# Patient Record
Sex: MALE | Race: WHITE | URBAN - METROPOLITAN AREA
[De-identification: names, ages, dates, MRNs, and addresses within clinical notes are randomized per-mention and may not be internally consistent; named-entity substitution may affect disease eponyms.]

---

## 2019-05-15 ENCOUNTER — TELEPHONE (OUTPATIENT)
Dept: SURGERY | Age: 64
End: 2019-05-15

## 2019-05-15 ENCOUNTER — PROCEDURE VISIT (OUTPATIENT)
Dept: SURGERY | Age: 64
End: 2019-05-15
Payer: COMMERCIAL

## 2019-05-15 VITALS
SYSTOLIC BLOOD PRESSURE: 117 MMHG | DIASTOLIC BLOOD PRESSURE: 76 MMHG | HEART RATE: 74 BPM | WEIGHT: 191 LBS | TEMPERATURE: 97.4 F | OXYGEN SATURATION: 96 %

## 2019-05-15 DIAGNOSIS — D07.4 SQUAMOUS CELL CARCINOMA IN SITU OF GLANS PENIS: Primary | ICD-10-CM

## 2019-05-15 PROCEDURE — 17311 MOHS 1 STAGE H/N/HF/G: CPT | Performed by: DERMATOLOGY

## 2019-05-15 PROCEDURE — 12042 INTMD RPR N-HF/GENIT2.6-7.5: CPT | Performed by: DERMATOLOGY

## 2019-05-15 PROCEDURE — 17312 MOHS ADDL STAGE: CPT | Performed by: DERMATOLOGY

## 2019-05-15 RX ORDER — OFLOXACIN 3 MG/ML
SOLUTION/ DROPS OPHTHALMIC
COMMUNITY

## 2019-05-15 RX ORDER — ASPIRIN 325 MG
325 TABLET ORAL DAILY
COMMUNITY

## 2019-05-15 RX ORDER — CEPHALEXIN 500 MG/1
500 CAPSULE ORAL 3 TIMES DAILY
Qty: 15 CAPSULE | Refills: 0 | Status: CANCELLED | OUTPATIENT
Start: 2019-05-15

## 2019-05-15 RX ORDER — CYCLOBENZAPRINE HCL 10 MG
10 TABLET ORAL
COMMUNITY

## 2019-05-15 RX ORDER — AMLODIPINE BESYLATE 10 MG/1
10 TABLET ORAL DAILY
COMMUNITY

## 2019-05-15 RX ORDER — GABAPENTIN 100 MG/1
100 CAPSULE ORAL 3 TIMES DAILY
COMMUNITY

## 2019-05-15 RX ORDER — LISINOPRIL 20 MG/1
20 TABLET ORAL DAILY
COMMUNITY

## 2019-05-15 RX ORDER — IBUPROFEN 600 MG/1
600 TABLET ORAL
COMMUNITY

## 2019-05-15 RX ORDER — PREDNISOLONE ACETATE 10 MG/ML
1 SUSPENSION/ DROPS OPHTHALMIC
COMMUNITY

## 2019-05-15 RX ORDER — GUAIFENESIN AND DEXTROMETHORPHAN HYDROBROMIDE 100; 10 MG/5ML; MG/5ML
5 SOLUTION ORAL
COMMUNITY

## 2019-05-15 RX ORDER — NAPROXEN 500 MG/1
500 TABLET ORAL 2 TIMES DAILY WITH MEALS
COMMUNITY

## 2019-05-15 RX ORDER — KETOROLAC TROMETHAMINE 4 MG/ML
SOLUTION/ DROPS OPHTHALMIC 4 TIMES DAILY
COMMUNITY

## 2019-05-15 RX ORDER — SIMVASTATIN 20 MG
20 TABLET ORAL NIGHTLY
COMMUNITY

## 2019-05-15 NOTE — PATIENT INSTRUCTIONS
[facility] Office Hours:    Monday-Thursday  7:30 AM-4:30 PM    Friday  9:00 AM-3:00 PM          POST-OPERATIVE CARE FOR STICHES              Bandage change after 48 hours    CARING FOR YOUR SURGICAL SITE  The bandage should remain on and completley dry for 48 hours. Do NOT get the bandage wet. 1. After the first 48 hours, gently remove the remaining part of the bandage. It can be helpful to moisten the bandage edges in the shower. Steri strips may still be on the wound. It is ok, they will fall off slowly with the daily bandage changes. 2. Gently clean the wound daily with mild soap and water. Try to clean off crust and debris. 3. Dry (pat) the area with a clean Q-tip or gauze. 4. Apply a layer of Vaseline/ Aquaphor (or Bacitracin if your doctor recommends) to the wound area only. 5. Cut a piece of Telfa (or any non-stick dressing) to fit just over the wound and secure it with paper tape. If the wound is small you may use a Band- Aid. Keep area covered for a total of 1 week(s). If the dressing comes off or if you have questions, or concerns about the dressing, please call the office for instructions! POST OPERATIVE INSTRUCTIONS    1. Activity: Do not lift anything heavier than a gallon of milk for 1 week. Also, avoid strenuous activity such as running, power walking or contact sports. 2. Eating and drinking: Do not drink alcohol for 48 hours after your procedure. Alcohol increases the chances of bleeding. 3. Medicines   -If you have discomfort, take Acetaminophen (Tylenol or Extra Strength Tylenol). Follow the instructions and warning on the bottle. -If your doctor has prescribed you an Aspirin daily, please keep taking it. Do not take extra Aspirin or medicines containing Aspirin (such as Bri-Randolph and Excedrin) for 48 hours  after your procedure. Bleeding: If bleeding occurs, DO NOT remove the bandage.  Put firm pressure on the area with gauze for 20 minutes without

## 2019-05-15 NOTE — PROGRESS NOTES
MOHS PROCEDURE NOTE    PHYSICIAN:  Maral Whiting. Ld Mittal MD    ASSISTANT: Stephan Cheek RN     REFERRING PROVIDER:  Richard Maradiaga PA-C    PREOPERATIVE DIAGNOSIS: Squamous Cell Carcinoma in Situ     SPECIFIC MOHS INDICATIONS:  location    AUC SCORIN/9    POSTOPERATIVE DIAGNOSIS: SAME    LOCATION: Left penile shaft    OPERATIVE PROCEDURE:  MOHS MICROGRAPHIC SURGERY    RECONSTRUCTION OF DEFECT: Intermediate layered closure    PREOPERATIVE SIZE: 24x24 MM    DEFECT SIZE: 34x26 MM    LENGTH OF REPAIRED WOUND/SIZE OF FLAP/SIZE OF GRAFT:  44 MM    ANESTHESIA:  8 mL 1% lidocaine with epinephrine 1:100,000 buffered. EBL:  MINIMAL    DURATION OF PROCEDURE:  2.75 HOURS    POSTOPERATIVE OBSERVATION: 0.75 HOUR    SPECIMENS:  SEE MOHS MAP    COMPLICATIONS:  NONE    DESCRIPTION OF PROCEDURE:  The patient was given a mirror, as appropriate, and the biopsy site was identified, marked with a surgical marking pen, and verified by the patient. Options for treatment were discussed and the patient was informed that Mohs surgery was the selected treatment based on its lower recurrence rate, given the features listed above, as compared to other treatment modalities such as excision, radiation, or curettage, and agreed with this treatment plan. Risks and benefits including bruising, swelling, bleeding, infection, nerve injury, recurrence, and scarring were discussed with the patient prior to the procedure and a written consent detailing these and other risks was reviewed with the patient and signed. There was a time out for person and procedure verification. The surgical site was prepped with an antiseptic solution. Application of an antiseptic solution was repeated before each surgical stage. Stage I:  The clinically-apparent tumor was carefully defined and debulked, determining the edge of the surgical excision.     A thin layer of tumor-laden tissue was excised with a narrow margin of normal-appearing skin, using the technique of Mohs. A map was prepared to correspond to the area of skin from which it was excised. Hemostasis was achieved using electrosurgery. The wound was bandaged. The tissue was prepared for the cryostat and sectioned. 1 section(s) prepared. Each section was coded, cut, and stained for microscopic examination. The entire base and margins of the excised piece of tissue were examined by the surgeon. STage I, II and III:  SCC - In situ: full thickness dysplasia with parakeratosis, acanthosis and complete disorganization of the epidermal architecture and loss of maturation. The cells are large with prominent irregular hyperchromatic nuclei. The remaining tumor was noted and the next stage was performed. Stage II, III and IV :  A thin layer of tissue was removed at the histologically-identified sites of remaining tumor. The entire procedure as described in stage I was repeated to process the tissue according to Mohs technique. 2 section(s) prepared for stage II, 1 section for stages III and IV. No tumor was identified at the peripheral margins of stage IV of microscopically controlled surgery. DEFECT MANAGEMENT:    REPAIR DESCRIPTION:  Various closure modalities were discussed with the patient, and it was decided that an intermediate layered repair would best preserve normal anatomic and functional relationships. Additional risk of wound dehiscence was discussed. The area was anesthetized with 1% lidocaine with epinephrine 1:100,000 buffered, was given a sterile prep using Chlorhexidine gluconate 4% solution and draped in the usual sterile fashion. Recreation and enlargement of the wound was performed by excising cones of tissue via the triangulation technique. The final incision lines were placed with respect for the patient's natural skin tension lines in a linear configuration to avoid functional and aesthetic distortion of adjacent free margins.  Following minimal undermining, meticulous hemostasis was obtained with spot monopolar electrocoagulation. Subcutaneous dead space and dermis were closed using 5-0 Vicryl buried subcutaneous interrupted suture and the epidermis was approximated with 6-0 Fast absorbing gut running epidermal sutures. WOUND COVERAGE:  The wound was cleaned with normal saline solution, dried off, Aquaphor ointment was applied, and the wound was covered. A dressing was applied for stabilization and light pressure. The patient was given detailed oral and written instructions on postoperative care. There were no complications. The patient left the Unit in good medical condition. FOLLOW-UP:  As dissolving sutures were placed, the patient was asked to return if any questions or concerns arose, but otherwise will return to see general dermatology per their instructions.

## 2019-05-16 ENCOUNTER — TELEPHONE (OUTPATIENT)
Dept: SURGERY | Age: 64
End: 2019-05-16

## 2019-05-16 NOTE — TELEPHONE ENCOUNTER
The patient was in the office on 5/15/19 for Mohs surgery located on the Left penile shaft with intermediate layered closure repair. The patient tolerated the procedure well and left the office in good condition. A post-operative telephone call was placed at 13:46 in order to check on the patient's recovery process. The patient reported doing well and had no complaints. All of the patient's questions were answered.

## 2019-06-03 ENCOUNTER — TELEPHONE (OUTPATIENT)
Dept: SURGERY | Age: 64
End: 2019-06-03

## 2019-06-03 NOTE — TELEPHONE ENCOUNTER
Pt could not get a ride for today; however, he has a ride for tomorrow 6/4 and has been scheduled for 11 a.m.  (FYI)     Pt was seen on 6/4.

## 2019-06-04 ENCOUNTER — OFFICE VISIT (OUTPATIENT)
Dept: SURGERY | Age: 64
End: 2019-06-04
Payer: COMMERCIAL

## 2019-06-04 DIAGNOSIS — Z51.89 VISIT FOR WOUND CHECK: Primary | ICD-10-CM

## 2019-06-04 DIAGNOSIS — T81.30XA WOUND DEHISCENCE: ICD-10-CM

## 2019-06-04 PROCEDURE — 4004F PT TOBACCO SCREEN RCVD TLK: CPT | Performed by: DERMATOLOGY

## 2019-06-04 PROCEDURE — G8421 BMI NOT CALCULATED: HCPCS | Performed by: DERMATOLOGY

## 2019-06-04 PROCEDURE — 3017F COLORECTAL CA SCREEN DOC REV: CPT | Performed by: DERMATOLOGY

## 2019-06-04 PROCEDURE — 99212 OFFICE O/P EST SF 10 MIN: CPT | Performed by: DERMATOLOGY

## 2019-06-04 PROCEDURE — G8427 DOCREV CUR MEDS BY ELIG CLIN: HCPCS | Performed by: DERMATOLOGY

## 2019-06-04 NOTE — PATIENT INSTRUCTIONS
[facility] Office Hours:    Monday-Thursday  7:30 AM-4:30 PM    Friday  9:00 AM-3:00 PM    Patient instructed to gently wash area with soap and water daily, pat dry, and apply vaseline or petroleum jelly a couple times throughout the day.

## 2019-06-04 NOTE — PROGRESS NOTES
S: The patient is here today for wound/scar check. The patient had Mohs surgery located on the glans penis with Intermediate layered closure repair, 2 week(s) ago. The patient c/o area opening after doing heavy lifting this past weekend. denies pain, discharge or other sx. EXAM: base of glans with superficial dehiscense that is already starting to granulate. No erythema, purulence    IMPRESSION/PLAN:  Dehiscence of wound: Will allow to heal by second intent. Pt to keep wound clean and apply aquaphor daily. Will recheck in about 3 weeks.

## 2019-06-25 ENCOUNTER — TELEPHONE (OUTPATIENT)
Dept: SURGERY | Age: 64
End: 2019-06-25

## 2019-06-25 NOTE — TELEPHONE ENCOUNTER
The patient called cx appt for today. He said the aera looks healed and is unable to make appt due to a ride issue from [employer]. He mentioned a nurse from our office disclosed to him that he needs to schedule a f/u appt with Lucinda Perry for somet time cream.  Please confirm exactly what the patient needs to do and let me know so I can inform the patient as well as notify Lucinda Perry. He has difficulty getting through to their office.

## 2019-07-17 NOTE — TELEPHONE ENCOUNTER
Pt had left a message asking if we could reach out to [facility]; he had been trying to contact them and was unable to get through. I emailed [facility] [employer] contact to contact patient.

## 2020-05-27 ENCOUNTER — TELEPHONE (OUTPATIENT)
Dept: SURGERY | Age: 65
End: 2020-05-27

## 2020-07-07 ENCOUNTER — TELEPHONE (OUTPATIENT)
Dept: SURGERY | Age: 65
End: 2020-07-07

## 2020-07-15 ENCOUNTER — OFFICE VISIT (OUTPATIENT)
Dept: SURGERY | Age: 65
End: 2020-07-15
Payer: MEDICARE

## 2020-07-15 VITALS — TEMPERATURE: 98.2 F

## 2020-07-15 PROBLEM — D40.8 NEOPLASM OF UNCERTAIN BEHAVIOR OF PENIS: Status: ACTIVE | Noted: 2020-07-15

## 2020-07-15 PROCEDURE — 54100 BIOPSY OF PENIS: CPT | Performed by: DERMATOLOGY

## 2020-07-15 PROCEDURE — 99212 OFFICE O/P EST SF 10 MIN: CPT | Performed by: DERMATOLOGY

## 2020-07-15 NOTE — PATIENT INSTRUCTIONS
[facility] Office Hours:    Monday-Thursday  7:30 AM-4:30 PM    Friday  9:00 AM-1:00 PM    Biopsy Wound Care Instructions   Cleanse the wound with mild soapy water daily.  Gently dry the area.  Apply Vaseline or petroleum jelly to the wound using a cotton tipped applicator.  Cover with a clean bandage.  Repeat this process until the biopsy site is healed.  If you had stitches placed, continue treating the site until the stitches are removed. Remember to make an appointment to return to have your stitches removed by our staff.  You may shower and bathe as usual.   ** Biopsy results generally take around 7 business days to come back. If you have not heard from us by then, please call the office at [phone] between [address] [place] [address] Monday through Friday.

## 2020-07-16 NOTE — PROGRESS NOTES
S: pt is here for f/u. He had MOhs surgery of extensive SCCIS on penile shaft last June. Did well following surgery and once healed was recommended to return to his referring dermatologist to discuss adjuvant tx regimens such as Aldara due to concern for likely HPV involvement. Pt did not return to his referring derm. He reports now a 1 month h/o new lesion on the penile shaft close to scar from previous Mohs surgery. No tx. No alleviating or exacerbating factors. O: wdwn, nad  On the penile shaft are several lesions  Distal:  Brown stuck on 3mm papule- r/o verruca vs bp  Distal medial:  Ill defined hyperpigmented erythematous scaly 10mm patch r/o SCCIS  Proximal medial:  10mm hyperkeratotic ulcerated papule r/o scc  Proximal:  Brown stuck on 3mm papule r/o verruca vs bp  Inferior: brown stuck on 3 mm papule r/o verruca vs bp    AP:  1. Neoplasm of uncertain behavior:  R/O SCCIS, vs BP vs SCC  Location: see above  - Discussed possible diagnosis. Patient agreeable to biopsy. Verbal consent obtained after risks (infection, bleeding, scar), benefits and alternatives explained. - The area to be biopsied was marked and a verbal time-out was performed. - Local anesthesia was achieved with 1% lidocaine with epinephrine/sodium bicarbonate. - The area was cleansed with alcohol and a tangential shave biopsy was performed using a derma-blade. Hemostasis was achieved with aluminum chloride. A small amount of petroleum jelly was applied to the wound and a band-aid applied.   - The specimens were placed in a correctly identified specimen container.  - 5 specimens sent to pathology. There were no immediate complications and the patient left the office in good condition.  -  Patient educated re: risk of bleeding, infection, scar and wound care instructions reviewed. The patient will be informed of biopsy results by phone or letter as soon as available.     May pretreat with aldara if pt requires surgical removal of any of the above lesion due to malignancy.

## 2020-07-20 ENCOUNTER — TELEPHONE (OUTPATIENT)
Dept: SURGERY | Age: 65
End: 2020-07-20

## 2020-07-20 LAB — DERMATOLOGY PATHOLOGY REPORT: ABNORMAL

## 2020-07-20 NOTE — TELEPHONE ENCOUNTER
I reviewed results of the biopsy with the patient. 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

## 2020-08-13 ENCOUNTER — TELEPHONE (OUTPATIENT)
Dept: SURGERY | Age: 65
End: 2020-08-13

## 2020-08-13 ENCOUNTER — PROCEDURE VISIT (OUTPATIENT)
Dept: SURGERY | Age: 65
End: 2020-08-13
Payer: MEDICARE

## 2020-08-13 PROCEDURE — 17311 MOHS 1 STAGE H/N/HF/G: CPT | Performed by: DERMATOLOGY

## 2020-08-13 PROCEDURE — 12042 INTMD RPR N-HF/GENIT2.6-7.5: CPT | Performed by: DERMATOLOGY

## 2020-08-13 PROCEDURE — 17312 MOHS ADDL STAGE: CPT | Performed by: DERMATOLOGY

## 2020-08-13 RX ORDER — CIPROFLOXACIN 500 MG/1
500 TABLET, FILM COATED ORAL 2 TIMES DAILY
Qty: 14 TABLET | Refills: 0 | Status: SHIPPED | OUTPATIENT
Start: 2020-08-13 | End: 2020-08-13 | Stop reason: CLARIF

## 2020-08-13 RX ORDER — DOXYCYCLINE HYCLATE 100 MG
100 TABLET ORAL 2 TIMES DAILY
Qty: 10 TABLET | Refills: 0 | Status: SHIPPED | OUTPATIENT
Start: 2020-08-13 | End: 2020-08-18

## 2020-08-13 NOTE — PROGRESS NOTES
PRE-PROCEDURE SCREENING    Pacemaker/ICD: No  Difficulty with numbing in the past: No  Local Anesthesia Reaction/passing out: No  Latex or adhesive allergy:  No  Bleeding/Clotting Disorders: No  Anticoagulant Therapy: Yes, asa 325 mg  Joint prosthesis: No  Artificial Heart Valve: No  Stroke or Seizures: Yes, hx of stroke  Organ Transplant or Lymphoma: No  Immunosuppression: No  Respiratory Problems: Yes, COPD

## 2020-08-13 NOTE — PROGRESS NOTES
The patient was counseled at length about the risks of jasmina Covid-19 during their perioperative period and any recovery window from their procedure. The patient was made aware that jasmina Covid-19  may worsen their prognosis for recovering from their procedure  and lend to a higher morbidity and/or mortality risk. All material risks, benefits, and reasonable alternatives including postponing the procedure were discussed. The patient does wish to proceed with the procedure at this time. MOHS PROCEDURE NOTE    PHYSICIAN:  Anthony Gomez. Tena Finch MD    ASSISTANT: Lynn Thomas RN     REFERRING PROVIDER:  Anthony Gomez. MD Korey    PREOPERATIVE DIAGNOSIS: Squamous Cell Carcinoma in Situ and Superficial Squamous Cell Carcinoma     SPECIFIC MOHS INDICATIONS:  size and location    AUC SCORIN/9    POSTOPERATIVE DIAGNOSIS: SAME    LOCATION: Medial proximal penile shaft and medial distal penile shaft    OPERATIVE PROCEDURE:  MOHS MICROGRAPHIC SURGERY    RECONSTRUCTION OF DEFECT: Intermediate layered closure    PREOPERATIVE SIZE: 24x11 MM    DEFECT SIZE: 37x28 MM    LENGTH OF REPAIRED WOUND/SIZE OF FLAP/SIZE OF GRAFT:  48 MM    ANESTHESIA: 9 mL 1% lidocaine with epinephrine 1:100,000 buffered. EBL:  MINIMAL    DURATION OF PROCEDURE:  2.25 HOURS    POSTOPERATIVE OBSERVATION: 0.75 HOUR    SPECIMENS:  SEE MOHS MAP    COMPLICATIONS:  NONE    DESCRIPTION OF PROCEDURE:  The patient was given a mirror, as appropriate, and the biopsy site was identified, marked with a surgical marking pen, and verified by the patient. Options for treatment were discussed and the patient was informed that Mohs surgery was the selected treatment based on its lower recurrence rate, given the features listed above, as compared to other treatment modalities such as excision, radiation, or curettage, and agreed with this treatment plan.   Risks and benefits including bruising, swelling, bleeding, infection, nerve injury, recurrence, and

## 2020-08-13 NOTE — PATIENT INSTRUCTIONS
[facility] Office Hours:    Monday-Thursday  7:30 AM-4:30 PM    Friday  9:00 AM-1:00 PM      POST-OPERATIVE CARE FOR STICHES  Bandage change after 48 hours    CARING FOR YOUR SURGICAL SITE  The bandage should remain on and completely dry for 48 hours. Do NOT get the bandage wet. 1. After the first 48 hours, gently remove the remaining part of the bandage. It can be helpful to moisten the bandage edges in the shower. Steri strips may still be on the wound. It is ok, they will fall off slowly with the daily bandage changes. 2. Gently clean the wound daily with mild soap and water. Try to clean off crust and debris. 3. Dry (pat) the area with a clean Q-tip or gauze. 4. Apply a layer of Vaseline/ Aquaphor (or Bacitracin if your doctor recommends) to the wound area only. 5. Cut a piece of Telfa (or any non-stick dressing) to fit just over the wound and secure it with paper tape. If the wound is small you may use a Band- Aid. Keep area covered for a total of 1 week(s). If the dressing comes off or if you have questions, or concerns about the dressing, please call the office for instructions! POST OPERATIVE INSTRUCTIONS    1. Activity: Do not lift anything heavier than a gallon of milk for 1 week. Also, avoid strenuous activity such as running, power walking or contact sports. 2. Eating and drinking: Do not drink alcohol for 48 hours after your procedure. Alcohol increases the chances of bleeding. 3. Medicines   -If you have discomfort, take Acetaminophen (Tylenol or Extra Strength Tylenol). Follow the instructions and warning on the bottle. -If your doctor has prescribed you an Aspirin daily, please keep taking it. Do not take extra Aspirin or medicines containing Aspirin (such as Bri-Castalia and Excedrin) for 48 hours after your procedure. Bleeding: If bleeding occurs, DO NOT remove the bandage. Put firm pressure on the area with gauze for 20 minutes without peeking.  If the bleeding continues, apply pressure for another 20 minutes. If the bleeding does not stop after you apply pressure, call us right away. If you can not call, go to the nearest emergency room or urgent care facility. What to expect:  You may have these symptoms. They are normal and should get better with time:  1. Swelling. Swelling usually increases for the first 48 hours after your procedure and then begins to improve. Some soreness and redness around your wound. If we worked close to your eyes (forehead, nose, temple, or upper cheeks) your eyes may become swollen and/ or black and blue. 2. Bruising, which could last 1 week or more. 3. Pink and bumpy appearance to the scar. This may happen a few weeks after your procedure. After 4 weeks, you may gently massage the area each day with facial moisturizer or petroleum jelly (Vaseline or Aquaphor). This will help to smooth the skin and improve the appearance of the scar. The color of your scar will fade over time, but it may be pink for several months after the procedure. The scar may take 6 months to 1 year to reach its final color and appearance. 4. \"Spitting\" suture. Occasionally, an inside suture (stitch) does not completely dissolve. When this happens, (generally 4-8 weeks after surgery), it causes a bump or \"pimple\" to form on the scar. This is easily removed and is not at all serious. It does not mean the skin cancer has returned. Contact us if it happens, but do not be alarmed. Vitamin E oil is NOT necessary. A good moisturizer is just as effective. Sunscreen IS necessary. Use at least and SPF 30 sunscreen daily- even in winter    Call us at [phone] right away if you have any of the following symptoms:  -Bleeding that you can not stop (see highlighted area above). -Pain that lasts longer than 48 hours.  -Your wound becomes more painful, red or hot.   -Bruising and swelling that does not begin to improve within the 48 hours or gets worse

## 2020-08-14 ENCOUNTER — TELEPHONE (OUTPATIENT)
Dept: SURGERY | Age: 65
End: 2020-08-14

## 2020-08-14 NOTE — TELEPHONE ENCOUNTER
The patient was in the office 8/13/20 for mohs located on the medial proximal penile shaft and medial distal penile shaft  with ILC repair. The patient tolerated the procedure well and left the office in good condition. A post-operative telephone call was placed at 10:02am on 8/14/20 in order to check on the patient's recovery process. The patient was not able to be reached and a phone message was left.

## 2020-08-18 ENCOUNTER — TELEPHONE (OUTPATIENT)
Dept: SURGERY | Age: 65
End: 2020-08-18

## 2020-08-18 NOTE — TELEPHONE ENCOUNTER
Jesse Mercedes from [facility] in [place], [place]. 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 Due to his age of 60 this vaccine is not available for patients over the age of 32. If there is new indication for something else please contact her at [phone] press option #1 and ask for Jesse Mercedes.

## 2020-08-21 NOTE — TELEPHONE ENCOUNTER
Michelle Mcintosh from [facility] office called stating that they will need a letter with the indication and reason for the HPV vaccine in order for his insurance to cover the 3 series of shots which is approximately $600 total.  She stated that the CDC did note that the age range was 32 - 51. In addition, another option would be to send patient to the [facility] Department to see if he can receive the Vaccine for free. She also requested path and Mohs reports to be faxed which were sent to [phone]. I informed her that pt will be here this Monday for an office visit. Please draft a letter.

## 2020-08-24 ENCOUNTER — OFFICE VISIT (OUTPATIENT)
Dept: SURGERY | Age: 65
End: 2020-08-24
Payer: MEDICARE

## 2020-08-24 ENCOUNTER — TELEPHONE (OUTPATIENT)
Dept: SURGERY | Age: 65
End: 2020-08-24

## 2020-08-24 PROCEDURE — 99212 OFFICE O/P EST SF 10 MIN: CPT | Performed by: DERMATOLOGY

## 2020-08-24 RX ORDER — IMIQUIMOD 12.5 MG/.25G
CREAM TOPICAL
Qty: 1 BOX | Refills: 1 | Status: SHIPPED | OUTPATIENT
Start: 2020-08-24 | End: 2020-08-31

## 2020-08-24 NOTE — PROGRESS NOTES
S:  The patient is here for suture removal s/p Mohs surgery on the *** and Intermediate layered closure repair, *** week(s) ago. The site appears well-healed without signs of infection (redness, pain or discharge). The sutures were removed. *** Wound care and activity instructions given. The patient was scheduled for follow-up *** for scar/wound check. The patient was scheduled for f/u with General Dermatology per their instructions.

## 2020-08-24 NOTE — PATIENT INSTRUCTIONS
[facility] Office Hours:    Monday-Thursday  7:30 AM-4:30 PM    Friday  9:00 AM-1:00 PM    Patient instructed to wash area daily and apply vaseline for one month. Patient instructed to wait two months (approx halloween) to start using aldara cream over entire penis.

## 2020-08-25 NOTE — PROGRESS NOTES
S:  The patient is here for suture removal s/p Mohs surgery on the penile shaft and Intermediate layered closure repair, 2 week(s) ago. The site appears well-healed without signs of infection (redness, pain or discharge). The sutures were removed. Small areas of dehiscence. D/w pt local wound care to promote healing. Wound care and activity instructions given. The patient was scheduled for follow-up in 3-4 months for scar/wound check. The patient was scheduled for f/u with General Dermatology per their instructions. 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 The CDC is recommending only up to age 39 so the patient falls outside the age range, however I do think there is a potential benefit for him. I discussed this with him and will draft a letter so that he can submit to his insurance company. In addition a prescription for aldara to use 3x week for 8-16 weeks for his genital warts.   Se: irritation

## 2020-11-24 ENCOUNTER — TELEPHONE (OUTPATIENT)
Dept: SURGERY | Age: 65
End: 2020-11-24

## 2020-11-24 NOTE — TELEPHONE ENCOUNTER
Left message for patient explaining that Dr. Nigel Castaneda only advised patient to use cream twice a week and not every day. Explained that one of the side effects of the medication can be skin irritation. Asked for patient to call with any questions or concerns.

## 2021-02-09 ENCOUNTER — TELEPHONE (OUTPATIENT)
Dept: SURGERY | Age: 66
End: 2021-02-09

## 2021-02-09 NOTE — TELEPHONE ENCOUNTER
Attempted to reach [employer] at [phone] but no answer and continuously brought back to answering system.

## 2021-02-09 NOTE — TELEPHONE ENCOUNTER
The patient called stating that his insurance has approved his HPV vaccine but when he called [employer] they need a prescription sent over for liability concerns due to his age. 77 [address] [facility]   Ph. [phone]      Please fax over script or call script over and then notify pt that it has been completed.

## 2021-02-09 NOTE — TELEPHONE ENCOUNTER
Called pharmacy and pharmacist Celeste Young) states that order is most likely not needed for this vaccine but he would put it in as an order. States that patient may have experienced an issue with insurance covering the vaccine, but it typically does not require an order. Order placed at [facility], [address] [place] [address]). Called pt to inform him that order was placed. Aware to call with further questions/concerns.

## 2021-05-06 ENCOUNTER — TELEPHONE (OUTPATIENT)
Dept: SURGERY | Age: 66
End: 2021-05-06

## 2021-05-06 NOTE — TELEPHONE ENCOUNTER
Called patient to inform him that prescription for injection was sent to pharmacy and available when patient ready.

## 2021-05-06 NOTE — TELEPHONE ENCOUNTER
The patient called stating he called [facility] and they do not have the order for HPV prescription. Location is [address] and ph number is:  [phone]. I called to confirm they have the order; spoke with Carlton [address] - fx: [phone]. They have [facility]. They do not have an official order from Dr. April Jerome and maira Melara) is requesting one. Please send script over through escrib or fax to [phone] and call pt to let him know it has been sent. He delayed in having this done due to Covid pandemic and what not.